# Patient Record
Sex: MALE | Race: BLACK OR AFRICAN AMERICAN | NOT HISPANIC OR LATINO | Employment: OTHER | URBAN - METROPOLITAN AREA
[De-identification: names, ages, dates, MRNs, and addresses within clinical notes are randomized per-mention and may not be internally consistent; named-entity substitution may affect disease eponyms.]

---

## 2017-01-24 ENCOUNTER — TELEPHONE (OUTPATIENT)
Dept: INTERNAL MEDICINE | Facility: CLINIC | Age: 22
End: 2017-01-24

## 2017-01-24 NOTE — TELEPHONE ENCOUNTER
Please advise pt mother states pt need a referral to be fax over to podiatry at [place] fax number [phone]

## 2017-01-26 DIAGNOSIS — B07.0 PLANTAR WART: Primary | ICD-10-CM

## 2017-03-27 ENCOUNTER — OFFICE VISIT (OUTPATIENT)
Dept: INTERNAL MEDICINE | Facility: CLINIC | Age: 22
End: 2017-03-27
Payer: MEDICAID

## 2017-03-27 VITALS
HEART RATE: 74 BPM | WEIGHT: 138.44 LBS | BODY MASS INDEX: 20.98 KG/M2 | SYSTOLIC BLOOD PRESSURE: 104 MMHG | DIASTOLIC BLOOD PRESSURE: 60 MMHG | HEIGHT: 68 IN

## 2017-03-27 DIAGNOSIS — L72.9 CYST OF SKIN: ICD-10-CM

## 2017-03-27 DIAGNOSIS — L02.412 ABSCESS OF AXILLA, LEFT: Primary | ICD-10-CM

## 2017-03-27 DIAGNOSIS — M79.671 RIGHT FOOT PAIN: ICD-10-CM

## 2017-03-27 PROCEDURE — 99213 OFFICE O/P EST LOW 20 MIN: CPT | Mod: PBBFAC | Performed by: INTERNAL MEDICINE

## 2017-03-27 PROCEDURE — 99999 PR PBB SHADOW E&M-EST. PATIENT-LVL III: CPT | Mod: PBBFAC,,, | Performed by: INTERNAL MEDICINE

## 2017-03-27 PROCEDURE — 99213 OFFICE O/P EST LOW 20 MIN: CPT | Mod: S$PBB,,, | Performed by: INTERNAL MEDICINE

## 2017-03-27 RX ORDER — DICLOFENAC SODIUM 10 MG/G
2 GEL TOPICAL 4 TIMES DAILY
Qty: 100 G | Refills: 1 | Status: SHIPPED | OUTPATIENT
Start: 2017-03-27 | End: 2017-04-06

## 2017-03-27 RX ORDER — SULFAMETHOXAZOLE AND TRIMETHOPRIM 800; 160 MG/1; MG/1
1 TABLET ORAL 2 TIMES DAILY
Qty: 20 TABLET | Refills: 0 | Status: SHIPPED | OUTPATIENT
Start: 2017-03-27 | End: 2017-04-06

## 2017-03-27 NOTE — PROGRESS NOTES
CHIEF COMPLAINT:  Cyst.    HISTORY OF PRESENT ILLNESS:  The patient is a 21-year-old gentleman who was   originally marked as a physical, but he is not due until July for his physical,   who comes in today with complaints of a bump in the right groin area.  Symptoms   started maybe two weeks ago.  He tried to express it, but nothing came out.  He   does have a history of hidradenitis.  He did have surgery for this condition.    REVIEW OF SYSTEMS:  The patient reports no fever or chills.  He has good range   of motion with his shoulders.  He did have some contractures after his   surgeries, but these have since been resolved.  He still has right foot pain for   what we felt was a plantar wart.  He has not been able to see a podiatrist yet   for this condition.  He will take ibuprofen 600 mg maybe twice a week.    PHYSICAL EXAMINATION:  GENERAL APPEARANCE:  No acute distress.  PULMONARY:  Good inspiratory and expiratory breath sounds are heard.  Lungs are   clear to auscultation.  CARDIOVASCULAR:  S1, S2.  EXTREMITIES:  Without edema.  ABDOMEN:  Nontender, nondistended, without hepatosplenomegaly.  The patient's right groin was evaluated.  The patient appears to have a cyst.    It appears to be a sebaceous cyst.  The patient's left axilla was evaluated.    The patient does have a small, less than 1 cm size bump.  He has right below it   a very tender pustule.    ASSESSMENT:  1.  Left axillary abscess.  2.  Right groin sebaceous cyst.    PLAN:  We will put the patient on Bactrim DS one p.o. b.i.d. for the next 10   days.  He is to follow up in two weeks for reevaluation.  We will try to see if   we can get diclofenac gel to put on his foot to help relieve the pain.    Ibuprofen does help, but I prefer him not to have to use such large doses.  This   is until he can get his foot seen by Podiatry.  The patient was advised to   contact his insurance to find out which providers take his insurance.      ABRAHAM/DEMAR  dd:  03/27/2017 12:15:01 (CDT)  td: 03/28/2017 08:29:15 (SUZYT)  Doc ID   #[id number]  Job ID #[id number]    CC:

## 2017-03-27 NOTE — MR AVS SNAPSHOT
[facility]  [address]  [facility] [address]  Phone: [phone]  Fax: [phone]                  Rico Fuller   3/27/2017 11:00 AM   Office Visit    Description:  Male : 1995   Provider:  Trey Obrien MD   Department:  [facility]           Reason for Visit     Cyst           Diagnoses this Visit        Comments    Abscess of axilla, left    -  Primary     Cyst of skin         Right foot pain                To Do List           Goals (5 Years of Data)     None      Follow-Up and Disposition     Return in about 2 weeks (around 4/10/2017).       These Medications        Disp Refills Start End    sulfamethoxazole-trimethoprim 800-160mg (BACTRIM DS) 800-160 mg Tab 20 tablet 0 3/27/2017 2017    Take 1 tablet by mouth 2 (two) times daily. - Oral    Pharmacy: [facility] [address] Ph #: [phone]       diclofenac sodium 1 % Gel 100 g 1 3/27/2017 2017    Apply 2 g topically 4 (four) times daily. - Topical    Pharmacy: [facility] [address] Ph #: [phone]         [facility]     [facility] Care Line -  Assistance  Registered nurses in the [facility] provide clinical advisement, health education, appointment booking, and other advisory services.  Call for this free service at [phone].             Medications           Message regarding Medications     Verify the changes and/or additions to your medication regime listed below are the same as discussed with your clinician today.  If any of these changes or additions are incorrect, please notify your healthcare provider.        START taking these NEW medications        Refills    sulfamethoxazole-trimethoprim 800-160mg (BACTRIM DS) 800-160 mg Tab 0    Sig: Take 1 tablet by mouth 2 (two) times daily.    Class: Normal    Route: Oral    diclofenac sodium 1 % Gel  "1    Sig: Apply 2 g topically 4 (four) times daily.    Class: Normal    Route: Topical           Verify that the below list of medications is an accurate representation of the medications you are currently taking.  If none reported, the list may be blank. If incorrect, please contact your healthcare provider. Carry this list with you in case of emergency.           Current Medications     ibuprofen (ADVIL,MOTRIN) 600 MG tablet Take 1 tablet (600 mg total) by mouth every 6 (six) hours as needed for Pain.    diclofenac sodium 1 % Gel Apply 2 g topically 4 (four) times daily.    sulfamethoxazole-trimethoprim 800-160mg (BACTRIM DS) 800-160 mg Tab Take 1 tablet by mouth 2 (two) times daily.           Clinical Reference Information           Your Vitals Were     BP Pulse Height Weight BMI    104/60 (BP Location: Right arm, Patient Position: Sitting, BP Method: Manual) 74 5' 8" (1.727 m) 62.8 kg (138 lb 7.2 oz) 21.05 kg/m2      Blood Pressure          Most Recent Value    BP  104/60      Allergies as of 3/27/2017     No Known Allergies      Immunizations Administered on Date of Encounter - 3/27/2017     None      [facility] Sign-Up     Activating your [facility] account is as easy as 1-2-3!     1) Visit [email or web address], select Sign Up Now, enter this activation code and your date of birth, then select Next.  [email or web address]  Expires: 5/11/2017 12:07 PM      2) Create a username and password to use when you visit [facility] in the future and select a security question in case you lose your password and select Next.    3) Enter your e-mail address and click Sign Up!    Additional Information  If you have questions, please e-mail [email or web address] or call [phone] to talk to our [facility] staff. Remember, [facility] is NOT to be used for urgent needs. For medical emergencies, dial 911.         Smoking Cessation     If you would like to quit smoking:   You may be eligible for free services if you are a [place] resident " and started smoking cigarettes before September 1, 1988.  Call the Smoking Cessation Trust (SCT) toll free at (581) 982-6886 or (739) 794-9387.   Call 1-800-QUIT-NOW if you do not meet the above criteria.            Language Assistance Services     ATTENTION: Language assistance services are available, free of charge. Please call [phone].      ATENCIÓN: Si habla español, tiene a logan disposición servicios gratuitos de asistencia lingüística. Llame al [phone].     CHÚ Ý: N?u b?n nói Ti?ng Vi?t, có các d?ch v? h? tr? ngôn ng? mi?n phí dành cho b?n. G?i s? [phone].         [facility] complies with applicable Federal civil rights laws and does not discriminate on the basis of race, color, national origin, age, disability, or sex.

## 2017-04-04 ENCOUNTER — TELEPHONE (OUTPATIENT)
Dept: INTERNAL MEDICINE | Facility: CLINIC | Age: 22
End: 2017-04-04

## 2017-04-04 NOTE — TELEPHONE ENCOUNTER
----- Message from Radha Baird sent at 4/4/2017 11:36 AM CDT -----  Contact: Fabiola (mom)  The diclofenac sodium 1 % Gel is too expensive and she would like Dr. Obrien to prescribe something else.     [facility] [place] [phone] (Phone) [phone] (Fax)    You can reach Fabiola at [phone].    Thanks!

## 2017-10-26 ENCOUNTER — TELEPHONE (OUTPATIENT)
Dept: INTERNAL MEDICINE | Facility: CLINIC | Age: 22
End: 2017-10-26

## 2017-10-26 NOTE — TELEPHONE ENCOUNTER
----- Message from Noni De La Vega sent at 10/26/2017 10:33 AM CDT -----  Dr Obrien bookout on him today.  He needs to follow up with him. First available not until Feb/2018 please call to schedule him and appt

## 2017-12-15 ENCOUNTER — HOSPITAL ENCOUNTER (EMERGENCY)
Facility: OTHER | Age: 22
Discharge: HOME | End: 2017-12-15
Attending: EMERGENCY MEDICINE
Payer: MEDICAID

## 2017-12-15 VITALS
HEIGHT: 68 IN | TEMPERATURE: 98 F | OXYGEN SATURATION: 99 % | RESPIRATION RATE: 18 BRPM | SYSTOLIC BLOOD PRESSURE: 125 MMHG | WEIGHT: 140 LBS | DIASTOLIC BLOOD PRESSURE: 85 MMHG | HEART RATE: 75 BPM | BODY MASS INDEX: 21.22 KG/M2

## 2017-12-15 DIAGNOSIS — L02.91 ABSCESS: Primary | ICD-10-CM

## 2017-12-15 PROCEDURE — 10060 I&D ABSCESS SIMPLE/SINGLE: CPT

## 2017-12-15 PROCEDURE — 99284 EMERGENCY DEPT VISIT MOD MDM: CPT | Mod: 25

## 2017-12-15 PROCEDURE — 25000003 PHARM REV CODE 250: Performed by: PHYSICIAN ASSISTANT

## 2017-12-15 RX ORDER — CEPHALEXIN 250 MG/1
500 CAPSULE ORAL 4 TIMES DAILY
Qty: 80 CAPSULE | Refills: 0 | Status: SHIPPED | OUTPATIENT
Start: 2017-12-15 | End: 2017-12-25

## 2017-12-15 RX ORDER — SULFAMETHOXAZOLE AND TRIMETHOPRIM 800; 160 MG/1; MG/1
1 TABLET ORAL 2 TIMES DAILY
Qty: 20 TABLET | Refills: 0 | Status: SHIPPED | OUTPATIENT
Start: 2017-12-15 | End: 2017-12-25

## 2017-12-15 RX ORDER — HYDROCODONE BITARTRATE AND ACETAMINOPHEN 5; 325 MG/1; MG/1
1 TABLET ORAL EVERY 6 HOURS PRN
Qty: 5 TABLET | Refills: 0 | Status: SHIPPED | OUTPATIENT
Start: 2017-12-15 | End: 2017-12-25

## 2017-12-15 RX ORDER — HYDROCODONE BITARTRATE AND ACETAMINOPHEN 5; 325 MG/1; MG/1
1 TABLET ORAL
Status: COMPLETED | OUTPATIENT
Start: 2017-12-15 | End: 2017-12-15

## 2017-12-15 RX ORDER — LIDOCAINE HYDROCHLORIDE 20 MG/ML
5 INJECTION, SOLUTION INFILTRATION; PERINEURAL
Status: COMPLETED | OUTPATIENT
Start: 2017-12-15 | End: 2017-12-15

## 2017-12-15 RX ADMIN — LIDOCAINE HYDROCHLORIDE 5 ML: 20 INJECTION, SOLUTION INFILTRATION; PERINEURAL at 06:12

## 2017-12-15 RX ADMIN — HYDROCODONE BITARTRATE AND ACETAMINOPHEN 1 TABLET: 5; 325 TABLET ORAL at 06:12

## 2017-12-15 NOTE — ED TRIAGE NOTES
Pt reports to ED c/o abscess to back of right thigh x 1 week and to lower sacrum x 4 days. Pt states unable to tolerate pain. Denies discharge, fevers.

## 2017-12-16 NOTE — ED PROVIDER NOTES
Encounter Date: 12/15/2017       History     Chief Complaint   Patient presents with    Abscess     multiple abscess to buttocks. denies any drainage. denies any fever     Patient is a 22-year-old male with no significant medical history who presents to the emergency department with wound to right buttocks.  He states over the last several days he has noticed swelling to his right buttocks.  He states he believes that he has a boil.  He states he has had this in the past.  He denies any drainage.  He denies fevers or chills.  He rates his pain 10 out of 10.      The history is provided by the patient.     Review of patient's allergies indicates:  No Known Allergies  No past medical history on file.  Past Surgical History:   Procedure Laterality Date    CYST REMOVAL      cyst removed from under both arms      Family History   Problem Relation Age of Onset    Asthma Mother     Clotting disorder Mother     Lupus Mother     Seizures Mother     Depression Mother     Seizures Father      Social History   Substance Use Topics    Smoking status: Current Every Day Smoker     Packs/day: 0.50     Years: 4.00     Types: Cigarettes    Smokeless tobacco: Never Used    Alcohol use No     Review of Systems   Constitutional: Negative for activity change, appetite change, chills, fatigue and fever.   HENT: Negative for congestion, ear discharge, ear pain, postnasal drip, rhinorrhea, sore throat and trouble swallowing.    Respiratory: Negative for cough and shortness of breath.    Cardiovascular: Negative for chest pain.   Gastrointestinal: Negative for abdominal pain, blood in stool, constipation, diarrhea, nausea and vomiting.   Genitourinary: Negative for dysuria, flank pain and hematuria.   Musculoskeletal: Negative for back pain, neck pain and neck stiffness.   Skin: Positive for wound. Negative for rash.   Neurological: Negative for dizziness, speech difficulty, weakness, light-headedness, numbness and headaches.        Physical Exam     Initial Vitals [12/15/17 1617]   BP Pulse Resp Temp SpO2   123/68 79 18 98.3 °F (36.8 °C) 97 %      MAP       86.33         Physical Exam    Nursing note and vitals reviewed.  Constitutional: He appears well-developed and well-nourished. He is not diaphoretic.  Non-toxic appearance. No distress.   HENT:   Head: Normocephalic.   Right Ear: Hearing and external ear normal.   Left Ear: Hearing and external ear normal.   Nose: Nose normal.   Mouth/Throat: Oropharynx is clear and moist. No oropharyngeal exudate.   Eyes: Conjunctivae are normal. Pupils are equal, round, and reactive to light.   Neck: Normal range of motion.   Cardiovascular: Normal rate and regular rhythm.   Pulmonary/Chest: Breath sounds normal.   Abdominal: Soft. Bowel sounds are normal. There is no tenderness.   Lymphadenopathy:     He has no cervical adenopathy.   Neurological: He is alert and oriented to person, place, and time.   Skin: Skin is warm and dry. Capillary refill takes less than 2 seconds.        Psychiatric: He has a normal mood and affect.         ED Course   I & D - Incision and Drainage  Date/Time: 12/16/2017 12:50 AM  Performed by: ANTONIETA MCCOY  Authorized by: MATEUSZ KWAN II   Type: abscess  Location: buttocks.  Anesthesia: local infiltration    Anesthesia:  Local Anesthetic: lidocaine 2% without epinephrine  Anesthetic total: 5 mL  Scalpel size: 11  Incision type: single straight  Complexity: simple  Drainage: purulent  Drainage amount: copious  Patient tolerance: Patient tolerated the procedure well with no immediate complications        Labs Reviewed - No data to display          Medical Decision Making:   Initial Assessment:   Urgent evaluation of 22-year-old male with no significant medical history who presents to the emergency department with wound to buttocks.  Patient is afebrile, nontoxic appearing, and hemodynamically stable.  On exam, patient has abscess on right buttocks.   I&D is performed with copious purulent drainage.  Patient is sent home with antibiotics.  He is advised to follow-up with PCP in 24-48 hours for wound reevaluation or return to the emergency department with any worsening symptoms or concerns.                   ED Course      Clinical Impression:   The encounter diagnosis was Abscess.                           Vanda Light PA-C  12/16/17 0051

## 2018-01-25 ENCOUNTER — TELEPHONE (OUTPATIENT)
Dept: INTERNAL MEDICINE | Facility: CLINIC | Age: 23
End: 2018-01-25

## 2018-01-25 NOTE — TELEPHONE ENCOUNTER
----- Message from Khloe Ariza sent at 1/24/2018  2:31 PM CST -----  Contact: self/ [phone]  Sooner appointment than the  can schedule.  Did you offer to schedule the next available appointment and put the patient on the wait list?:    When is the first available appointment:   What is the nature of the appointment: phys  What visit type: epp  Patient preference of timeframe to be scheduled:    Comments:

## 2018-01-25 NOTE — TELEPHONE ENCOUNTER
No answer, message left for the pt to call back.    Okay to scheduled an urgent care appt once the pt call back.

## 2018-08-01 ENCOUNTER — OFFICE VISIT (OUTPATIENT)
Dept: INTERNAL MEDICINE | Facility: CLINIC | Age: 23
End: 2018-08-01
Payer: MEDICAID

## 2018-08-01 VITALS
HEIGHT: 68 IN | DIASTOLIC BLOOD PRESSURE: 60 MMHG | HEART RATE: 92 BPM | SYSTOLIC BLOOD PRESSURE: 120 MMHG | WEIGHT: 146 LBS | BODY MASS INDEX: 22.13 KG/M2

## 2018-08-01 DIAGNOSIS — H02.9 LESION OF EYELID: Primary | ICD-10-CM

## 2018-08-01 PROCEDURE — 99213 OFFICE O/P EST LOW 20 MIN: CPT | Mod: S$PBB,,, | Performed by: PHYSICIAN ASSISTANT

## 2018-08-01 PROCEDURE — 99999 PR PBB SHADOW E&M-EST. PATIENT-LVL IV: CPT | Mod: PBBFAC,,, | Performed by: PHYSICIAN ASSISTANT

## 2018-08-01 PROCEDURE — 99214 OFFICE O/P EST MOD 30 MIN: CPT | Mod: PBBFAC | Performed by: PHYSICIAN ASSISTANT

## 2018-08-01 RX ORDER — SULFAMETHOXAZOLE AND TRIMETHOPRIM 800; 160 MG/1; MG/1
1 TABLET ORAL 2 TIMES DAILY
Qty: 14 TABLET | Refills: 0 | Status: SHIPPED | OUTPATIENT
Start: 2018-08-01 | End: 2020-12-01 | Stop reason: ALTCHOICE

## 2018-08-01 RX ORDER — ERYTHROMYCIN 5 MG/G
OINTMENT OPHTHALMIC 3 TIMES DAILY
Qty: 3.5 G | Refills: 0 | Status: SHIPPED | OUTPATIENT
Start: 2018-08-01 | End: 2020-12-01

## 2018-08-01 NOTE — PATIENT INSTRUCTIONS
Warm compresses to the left eye      Sty (or Stye)  A sty is an infection of the oil gland of the eyelid. It may develop into a small pocket of pus (abscess). This can cause pain, redness, and swelling. In early stages, styes are treated with antibiotic cream, eye drops, or warm packs (small towels soaked in warm water). More severe cases may need to be opened and drained by a health care provider.  Home care  · Eye drops or ointment are usually prescribed to treat the infection. Use these as directed.   ¨ Artificial tears may also be used to lubricate the eye and make it more comfortable. These may be purchased without a prescription.   ¨ Talk to your health care provider before using any over-the-counter treatment for a sty.  · Apply a warm, damp towel to the affected eye for at least 5 minutes, 3 to 4 times a day for a week. Warm compresses open the pores and speed the healing. If the compresses are too hot, they may burn your eyelid.  · Sometimes the sty will drain with this treatment alone. If this happens, continue the antibiotic until all the redness and swelling are gone.  · Wash your hands before and after touching the infected eye to avoid spreading the infection.  · Do not squeeze or try to puncture the sty.  Follow-up care  Follow up with your health care provider, or as advised.   When to seek medical advice  Call your health care provider right away if you have:  · Increase in swelling or redness around the eyelid after 48 to 72 hours  · Increase in eye pain or the eyelid blisters  · Increase in warmth--the eyelid feels hot  · Drainage of blood or thick pus from the sty  · Blister on the eyelid  · Inability to open the eyelid due to swelling  · Fever  ¨ 1 degree above your normal temperature lasting for 24 to 48 hours, or  ¨ Whatever your health care provider told you to report based on your medical condition  · Vision changes  · Headache or stiff neck  · Recurrence of the sty  Date Last Reviewed:  6/14/2015  © 3845-9131 [employer]. [address] [place] [address], [place] [address]. All rights reserved. This information is not intended as a substitute for professional medical care. Always follow your healthcare professional's instructions.

## 2018-08-01 NOTE — PROGRESS NOTES
"Subjective:       Patient ID: Rico Fuller is a 23 y.o. male.        Chief Complaint: Follow-up    Rico Fuller is an established patient of Trey Obrien MD here today for urgent care visit.    "Bump" left eye x 2 weeks.  He squeezed it and it got a little smaller.  He was not able to get it to open.  It is mildly painful when he pushes on it.  No visual disturbance.  He has been applying alcohol to it.             Review of Systems   Constitutional: Negative for appetite change, chills, fatigue and fever.   HENT: Negative for congestion and sore throat.         Bump above left eye   Eyes: Negative for visual disturbance.   Respiratory: Negative for cough, chest tightness and shortness of breath.    Cardiovascular: Negative for chest pain, palpitations and leg swelling.   Gastrointestinal: Negative for abdominal pain, blood in stool, constipation, diarrhea, nausea and vomiting.   Genitourinary: Negative for dysuria, frequency, hematuria and urgency.   Musculoskeletal: Negative for arthralgias and back pain.   Skin: Negative for rash.   Neurological: Negative for dizziness, syncope, weakness and headaches.   Psychiatric/Behavioral: Negative for dysphoric mood and sleep disturbance. The patient is not nervous/anxious.        Objective:      Physical Exam   Constitutional: He appears well-developed and well-nourished. No distress.   HENT:   Head: Normocephalic.   Right Ear: External ear normal.   Left Ear: External ear normal.   Eyes: Conjunctivae and EOM are normal. Pupils are equal, round, and reactive to light. Right eye exhibits no discharge. Left eye exhibits no discharge.       Neck: Neck supple.   Pulmonary/Chest: Effort normal.   Abdominal: Soft.   Neurological: He is alert.   Skin: Skin is warm and dry. He is not diaphoretic.   Psychiatric: He has a normal mood and affect.       Assessment:       1. Lesion of eyelid        Plan:       Rico was seen today for follow-up.    Diagnoses and all orders for " "this visit:    Lesion of eyelid  -     Ambulatory referral to Optometry  -     sulfamethoxazole-trimethoprim 800-160mg (BACTRIM DS) 800-160 mg Tab; Take 1 tablet by mouth 2 (two) times daily.  -     erythromycin (ROMYCIN) ophthalmic ointment; Place into the left eye 3 (three) times daily.    Warm compresses to affected area.  F/u with optometry if not resolving.    Pt has been given instructions populated from [facility] database and has verbalized understanding of the after visit summary and information contained wherein.    Follow up with a primary care provider. May go to ER for acute shortness of breath, lightheadedness, fever, or any other emergent complaints or changes in condition.    "This note will be shared with the patient"    No future appointments.            "

## 2018-08-02 ENCOUNTER — TELEPHONE (OUTPATIENT)
Dept: INTERNAL MEDICINE | Facility: CLINIC | Age: 23
End: 2018-08-02

## 2018-08-02 NOTE — TELEPHONE ENCOUNTER
----- Message from Noni De La Vega sent at 8/1/2018  3:01 PM CDT -----  Mr Gómez needs to be seen next week. Nothing is coming up to me.  Please call with an apt

## 2018-08-02 NOTE — TELEPHONE ENCOUNTER
Unable to get sooner appt, scheduled appt across the street and added to wait list for sooner appt.

## 2018-10-04 ENCOUNTER — OFFICE VISIT (OUTPATIENT)
Dept: OPTOMETRY | Facility: CLINIC | Age: 23
End: 2018-10-04
Payer: MEDICAID

## 2018-10-04 DIAGNOSIS — H40.013 OAG (OPEN ANGLE GLAUCOMA) SUSPECT, LOW RISK, BILATERAL: ICD-10-CM

## 2018-10-04 DIAGNOSIS — D23.10 DERMOID CYST OF LEFT EYELID: Primary | ICD-10-CM

## 2018-10-04 DIAGNOSIS — H50.331 INTERMITTENT MONOCULAR EXOTROPIA, RIGHT EYE: ICD-10-CM

## 2018-10-04 PROCEDURE — 92004 COMPRE OPH EXAM NEW PT 1/>: CPT | Mod: S$PBB,,, | Performed by: OPTOMETRIST

## 2018-10-04 PROCEDURE — 99213 OFFICE O/P EST LOW 20 MIN: CPT | Mod: PBBFAC,25 | Performed by: OPTOMETRIST

## 2018-10-04 PROCEDURE — 99999 PR PBB SHADOW E&M-EST. PATIENT-LVL III: CPT | Mod: PBBFAC,,, | Performed by: OPTOMETRIST

## 2018-10-04 PROCEDURE — 92133 CPTRZD OPH DX IMG PST SGM ON: CPT | Mod: PBBFAC | Performed by: OPTOMETRIST

## 2018-10-04 NOTE — PROGRESS NOTES
HPI     Pt is here for annual exam and lesion OS. Lesion has been there for two   months. Pt states that lesion decreased in size however it enlarged a   month ago. No changes in va. Some  Sharp pain in the are where the lesion   is.     Last edited by Felicia De La Cruz on 10/4/2018  9:22 AM. (History)            Assessment /Plan     For exam results, see Encounter Report.    Dermoid cyst of left eyelid  -     Ambulatory Referral to Ophthalmology  -Referral Rosita excision    Intermittent monocular exotropia, right eye  -Long standing, good VA    OAG (open angle glaucoma) suspect, low risk, bilateral  -     OCT, Optic Nerve - OU - Both Eyes; Future  -OCT RNFL thinning, FHx Mother  -HVF 24-2ss with review  -consider Latanoprost if defect on HVF  -Suspect vs Physio enlarged CD's        RTC as directed OMD

## 2018-11-15 ENCOUNTER — TELEPHONE (OUTPATIENT)
Dept: OPHTHALMOLOGY | Facility: CLINIC | Age: 23
End: 2018-11-15

## 2018-11-15 NOTE — TELEPHONE ENCOUNTER
----- Message from Madelin Cortez sent at 11/15/2018  9:20 AM CST -----  Contact: Essie (girlfriend)  Pt girlfriend states that transportation cannot pick pt up for his appt today so pt have to cancel this appt. Pt girlfriend states pt need to reschedule this appt. I tried to reschedule this appt but I couldn't. Pt girlfriend can be reached at [phone].

## 2018-11-27 ENCOUNTER — TELEPHONE (OUTPATIENT)
Dept: OPHTHALMOLOGY | Facility: CLINIC | Age: 23
End: 2018-11-27

## 2018-11-27 NOTE — TELEPHONE ENCOUNTER
----- Message from Madelin Cortez sent at 11/27/2018  4:05 PM CST -----  Contact: Essie (girlfriend)  Essie pt girlfriend stated that they cancel pt appt on 11/15 but they didn't cancel pt appt on 11/29. Essie pt girlfriend wanted to know why was the appt on 11/29 cancel. Essie can be reached at [phone].

## 2018-11-27 NOTE — TELEPHONE ENCOUNTER
/After looking in the chart, it shows that patient himself cx appt on 11/15/18 due to transportation. It also shows that girlfriend, Essie, cx appt on 11/29/19 due to transportation issues as well. Essie denies cx this appt. She wanted to speak with supervisor. After speaking with Sharonda, Sharonda overbooked pt on 12/4/18.    No further questions/concerns.

## 2018-12-04 ENCOUNTER — TELEPHONE (OUTPATIENT)
Dept: OPHTHALMOLOGY | Facility: CLINIC | Age: 23
End: 2018-12-04

## 2018-12-04 NOTE — TELEPHONE ENCOUNTER
Patient was scheduled in early November and patient r/s due to transportation issues.    Patient's girlfriend called and cancelled the patient appointment to late November due to further transportation issues.    Patient's girlfriend called and complained that the patient's appointment was cancelled without permission from her or the patient. It was noted in the chart that both the patient and the girlfriend r/s or cancelled both appointments due to transportation issue.    Girlfriend requested to speak with manager and Sharonda overbooked Dr. Becker's schedule to get patient seen today.    Today patient has no-showed appt.

## 2019-01-15 ENCOUNTER — TELEPHONE (OUTPATIENT)
Dept: OPHTHALMOLOGY | Facility: CLINIC | Age: 24
End: 2019-01-15

## 2019-01-15 NOTE — TELEPHONE ENCOUNTER
Call was transferred to me from phone room, from an outside clinic ?[facility]. Ms Arreguin told me she worked there. I offered her our 1st available consult. 3/28/19 and asked her to send us a referral, and past notes. She then told me she was his girlfriend. I asked for her name she told me her name is Carmen. Previous appointments have been cx, or ns.

## 2020-08-17 ENCOUNTER — HOSPITAL ENCOUNTER (EMERGENCY)
Facility: OTHER | Age: 25
Discharge: HOME | End: 2020-08-17
Attending: EMERGENCY MEDICINE
Payer: MEDICAID

## 2020-08-17 VITALS
HEART RATE: 91 BPM | HEIGHT: 68 IN | DIASTOLIC BLOOD PRESSURE: 80 MMHG | BODY MASS INDEX: 21.98 KG/M2 | SYSTOLIC BLOOD PRESSURE: 133 MMHG | OXYGEN SATURATION: 98 % | RESPIRATION RATE: 18 BRPM | WEIGHT: 145 LBS | TEMPERATURE: 98 F

## 2020-08-17 DIAGNOSIS — R30.0 DYSURIA: Primary | ICD-10-CM

## 2020-08-17 LAB
BILIRUB UR QL STRIP: NEGATIVE
CLARITY UR: CLEAR
COLOR UR: YELLOW
GLUCOSE UR QL STRIP: NEGATIVE
HGB UR QL STRIP: NEGATIVE
KETONES UR QL STRIP: NEGATIVE
LEUKOCYTE ESTERASE UR QL STRIP: NEGATIVE
NITRITE UR QL STRIP: NEGATIVE
PH UR STRIP: 7 [PH] (ref 5–8)
PROT UR QL STRIP: NEGATIVE
SP GR UR STRIP: 1.02 (ref 1–1.03)
URN SPEC COLLECT METH UR: NORMAL
UROBILINOGEN UR STRIP-ACNC: NEGATIVE EU/DL

## 2020-08-17 PROCEDURE — 96372 THER/PROPH/DIAG INJ SC/IM: CPT

## 2020-08-17 PROCEDURE — 63600175 PHARM REV CODE 636 W HCPCS: Performed by: PHYSICIAN ASSISTANT

## 2020-08-17 PROCEDURE — 87591 N.GONORRHOEAE DNA AMP PROB: CPT

## 2020-08-17 PROCEDURE — 63700000 PHARM REV CODE 250 ALT 637 W/O HCPCS: Performed by: PHYSICIAN ASSISTANT

## 2020-08-17 PROCEDURE — 99284 EMERGENCY DEPT VISIT MOD MDM: CPT | Mod: 25

## 2020-08-17 PROCEDURE — 81003 URINALYSIS AUTO W/O SCOPE: CPT

## 2020-08-17 RX ORDER — CEFTRIAXONE 250 MG/1
250 INJECTION, POWDER, FOR SOLUTION INTRAMUSCULAR; INTRAVENOUS
Status: COMPLETED | OUTPATIENT
Start: 2020-08-17 | End: 2020-08-17

## 2020-08-17 RX ORDER — AZITHROMYCIN 250 MG/1
1000 TABLET, FILM COATED ORAL
Status: COMPLETED | OUTPATIENT
Start: 2020-08-17 | End: 2020-08-17

## 2020-08-17 RX ADMIN — CEFTRIAXONE SODIUM 250 MG: 250 INJECTION, POWDER, FOR SOLUTION INTRAMUSCULAR; INTRAVENOUS at 01:08

## 2020-08-17 RX ADMIN — AZITHROMYCIN MONOHYDRATE 1000 MG: 250 TABLET ORAL at 01:08

## 2020-08-17 NOTE — ED TRIAGE NOTES
Patient presents to ED with c/o lower abdominal cramping, urinary frequency, urgency, and dysuria x 2 weeks with mild white discharge. He reports having unprotected sex recently. Denies fever

## 2020-08-17 NOTE — ED PROVIDER NOTES
Encounter Date: 8/17/2020       History     Chief Complaint   Patient presents with    Dysuria     +Pt reports unprotected sex x1 month ago, now reporting dysuria with urgency/frequency. Denies heamturia, discharge.     Exposure to STD     Patient is a 25-year-old male with no significant medical history who presents to the emergency department with dysuria.  Patient states 1 month ago he had unprotected sex.  He states over the last several days he has had dysuria and urinary frequency.  He states that he does have slight discharge.  He denies any penile lesions.  He denies fevers or chills.  He denies hematuria.    The history is provided by the patient.     Review of patient's allergies indicates:  No Known Allergies  No past medical history on file.  Past Surgical History:   Procedure Laterality Date    CYST REMOVAL      cyst removed from under both arms      Family History   Problem Relation Age of Onset    Asthma Mother     Clotting disorder Mother     Lupus Mother     Seizures Mother     Depression Mother     Seizures Father      Social History     Tobacco Use    Smoking status: Current Every Day Smoker     Packs/day: 0.50     Years: 4.00     Pack years: 2.00     Types: Cigarettes    Smokeless tobacco: Never Used   Substance Use Topics    Alcohol use: No     Alcohol/week: 0.0 standard drinks    Drug use: Yes     Types: Marijuana     Review of Systems   Constitutional: Negative for activity change, appetite change, chills, fatigue and fever.   HENT: Negative for congestion, ear discharge, ear pain, rhinorrhea and sore throat.    Respiratory: Negative for cough.    Gastrointestinal: Negative for abdominal pain, blood in stool, constipation, diarrhea, nausea and vomiting.   Genitourinary: Positive for discharge, dysuria and frequency.   Musculoskeletal: Negative for back pain, neck pain and neck stiffness.   Skin: Negative for rash and wound.   Neurological: Negative for dizziness, weakness,  light-headedness and headaches.       Physical Exam     Initial Vitals [08/17/20 1113]   BP Pulse Resp Temp SpO2   133/80 91 18 98.3 °F (36.8 °C) 98 %      MAP       --         Physical Exam    Nursing note and vitals reviewed.  Constitutional: He appears well-developed and well-nourished. He is not diaphoretic.  Non-toxic appearance. No distress.   HENT:   Head: Normocephalic.   Right Ear: Hearing and external ear normal.   Left Ear: Hearing and external ear normal.   Nose: Nose normal.   Mouth/Throat: Oropharynx is clear and moist. No oropharyngeal exudate.   Eyes: Conjunctivae are normal. Pupils are equal, round, and reactive to light.   Neck: Normal range of motion.   Cardiovascular: Normal rate and regular rhythm.   Pulmonary/Chest: Breath sounds normal.   Abdominal: Soft. Bowel sounds are normal. There is no abdominal tenderness. There is no rigidity, no rebound, no guarding, no CVA tenderness, no tenderness at McBurney's point and negative Zhang's sign.   Lymphadenopathy:     He has no cervical adenopathy.   Neurological: He is alert and oriented to person, place, and time.   Skin: Skin is warm and dry. Capillary refill takes less than 2 seconds.   Psychiatric: He has a normal mood and affect.         ED Course   Procedures  Labs Reviewed   C. TRACHOMATIS/N. GONORRHOEAE BY AMP DNA   URINALYSIS, REFLEX TO URINE CULTURE          Imaging Results    None          Medical Decision Making:   Initial Assessment:   Urgent evaluation of a 25-year-old female with no significant medical history who presents to the emergency department with dysuria.  Patient is afebrile, nontoxic appearing, and hemodynamically stable.  Benign abdominal exam.  No CVA tenderness.  Patient is concerned for STDs.  Urinalysis and culture will be obtained.  Patient will be given empiric treatment for gonorrhea and chlamydia.  ED Management:  12:50 PM  Pt has received empiric treatment.  Advised to follow up with PCP in 24 - 48 hours for  reevaluation or return to ED with a ny worsening symptoms or concerns.                                 Clinical Impression:       ICD-10-CM ICD-9-CM   1. Dysuria  R30.0 788.1                                Vanda Light PA-C  08/17/20 1251

## 2020-08-17 NOTE — FIRST PROVIDER EVALUATION
Emergency Department TeleTRIAGE Encounter Note      CHIEF COMPLAINT    Chief Complaint   Patient presents with    Dysuria     +Pt reports unprotected sex x1 month ago, now reporting dysuria with urgency/frequency. Denies heamturia, discharge.     Exposure to STD       VITAL SIGNS   Initial Vitals [08/17/20 1113]   BP Pulse Resp Temp SpO2   133/80 91 18 98.3 °F (36.8 °C) 98 %      MAP       --            ALLERGIES    Review of patient's allergies indicates:  No Known Allergies    PROVIDER TRIAGE NOTE  26 y/o male which presents with right kidney/bladder pain that began about 2 weeks ago. He is also having a penile discharge.       ORDERS  Labs Reviewed   C. TRACHOMATIS/N. GONORRHOEAE BY AMP DNA   URINALYSIS, REFLEX TO URINE CULTURE       ED Orders (720h ago, onward)    Start Ordered     Status Ordering Provider    08/17/20 1123 08/17/20 1122  Urinalysis, Reflex to Urine Culture Urine, Clean Catch  STAT      Ordered TETE PADRON    08/17/20 1122 08/17/20 1122  C. trachomatis/N. gonorrhoeae by AMP DNA [facility]; Urine  STAT  Collect    Ordered TETE PADRON            Virtual Visit Note: The provider triage portion of this emergency department evaluation and documentation was performed via [employer], a HIPAA-compliant telemedicine application, in concert with a tele-presenter in the room. A face to face patient evaluation with one of my colleagues will occur once the patient is placed in an emergency department room.      DISCLAIMER: This note was prepared with [employer] voice recognition transcription software. Garbled syntax, mangled pronouns, and other bizarre constructions may be attributed to that software system.

## 2020-08-21 LAB
C TRACH RRNA SPEC QL NAA+PROBE: NORMAL
N GONORRHOEA RRNA SPEC QL NAA+PROBE: NORMAL

## 2020-08-31 ENCOUNTER — TELEPHONE (OUTPATIENT)
Dept: INTERNAL MEDICINE | Facility: CLINIC | Age: 25
End: 2020-08-31

## 2020-08-31 DIAGNOSIS — N34.1 URETHRITIS, NONSPECIFIC: Primary | ICD-10-CM

## 2020-08-31 NOTE — TELEPHONE ENCOUNTER
----- Message from Trey Obrien MD sent at 8/31/2020  7:17 AM CDT -----  Please contact patient. He needs to do a urine sample for GC/Chlamydia. He was seen in the ED for urethritis . His test was indeterminate and he needs a repeat urine test. Order is in.

## 2020-09-01 ENCOUNTER — TELEPHONE (OUTPATIENT)
Dept: INTERNAL MEDICINE | Facility: CLINIC | Age: 25
End: 2020-09-01

## 2020-12-01 ENCOUNTER — LAB VISIT (OUTPATIENT)
Dept: PRIMARY CARE CLINIC | Facility: CLINIC | Age: 25
End: 2020-12-01
Payer: MEDICAID

## 2020-12-01 ENCOUNTER — OFFICE VISIT (OUTPATIENT)
Dept: PRIMARY CARE CLINIC | Facility: CLINIC | Age: 25
End: 2020-12-01
Payer: MEDICAID

## 2020-12-01 VITALS
SYSTOLIC BLOOD PRESSURE: 130 MMHG | WEIGHT: 145.5 LBS | DIASTOLIC BLOOD PRESSURE: 68 MMHG | OXYGEN SATURATION: 99 % | HEIGHT: 68 IN | TEMPERATURE: 98 F | HEART RATE: 97 BPM | BODY MASS INDEX: 22.05 KG/M2

## 2020-12-01 DIAGNOSIS — Z20.2 STD EXPOSURE: ICD-10-CM

## 2020-12-01 DIAGNOSIS — R36.9 PENILE DISCHARGE: Primary | ICD-10-CM

## 2020-12-01 LAB
BILIRUB UR QL STRIP: NEGATIVE
CLARITY UR REFRACT.AUTO: CLEAR
COLOR UR AUTO: YELLOW
GLUCOSE UR QL STRIP: NEGATIVE
HGB UR QL STRIP: NEGATIVE
KETONES UR QL STRIP: NEGATIVE
LEUKOCYTE ESTERASE UR QL STRIP: NEGATIVE
NITRITE UR QL STRIP: NEGATIVE
PH UR STRIP: 6 [PH] (ref 5–8)
PROT UR QL STRIP: NEGATIVE
SP GR UR STRIP: 1.02 (ref 1–1.03)
URN SPEC COLLECT METH UR: NORMAL

## 2020-12-01 PROCEDURE — 99213 OFFICE O/P EST LOW 20 MIN: CPT | Mod: PBBFAC,PN | Performed by: FAMILY MEDICINE

## 2020-12-01 PROCEDURE — 86592 SYPHILIS TEST NON-TREP QUAL: CPT

## 2020-12-01 PROCEDURE — 86703 HIV-1/HIV-2 1 RESULT ANTBDY: CPT

## 2020-12-01 PROCEDURE — 99214 OFFICE O/P EST MOD 30 MIN: CPT | Mod: S$PBB,,, | Performed by: FAMILY MEDICINE

## 2020-12-01 PROCEDURE — 81003 URINALYSIS AUTO W/O SCOPE: CPT

## 2020-12-01 PROCEDURE — 99999 PR PBB SHADOW E&M-EST. PATIENT-LVL III: CPT | Mod: PBBFAC,,, | Performed by: FAMILY MEDICINE

## 2020-12-01 PROCEDURE — 80074 ACUTE HEPATITIS PANEL: CPT

## 2020-12-01 PROCEDURE — 99999 PR PBB SHADOW E&M-EST. PATIENT-LVL III: ICD-10-PCS | Mod: PBBFAC,,, | Performed by: FAMILY MEDICINE

## 2020-12-01 PROCEDURE — 36415 COLL VENOUS BLD VENIPUNCTURE: CPT | Mod: PBBFAC,PN

## 2020-12-01 PROCEDURE — 99214 PR OFFICE/OUTPT VISIT, EST, LEVL IV, 30-39 MIN: ICD-10-PCS | Mod: S$PBB,,, | Performed by: FAMILY MEDICINE

## 2020-12-01 RX ORDER — AZITHROMYCIN 500 MG/1
1000 TABLET, FILM COATED ORAL ONCE
Qty: 2 TABLET | Refills: 0 | Status: SHIPPED | OUTPATIENT
Start: 2020-12-01 | End: 2020-12-01

## 2020-12-01 RX ORDER — CEFTRIAXONE 250 MG/1
250 INJECTION, POWDER, FOR SOLUTION INTRAMUSCULAR; INTRAVENOUS
Status: COMPLETED | OUTPATIENT
Start: 2020-12-01 | End: 2020-12-01

## 2020-12-01 RX ORDER — METRONIDAZOLE 500 MG/1
500 TABLET ORAL EVERY 12 HOURS
Qty: 14 TABLET | Refills: 0 | Status: SHIPPED | OUTPATIENT
Start: 2020-12-01 | End: 2020-12-08

## 2020-12-01 RX ADMIN — CEFTRIAXONE SODIUM 250 MG: 1 INJECTION, POWDER, FOR SOLUTION INTRAMUSCULAR; INTRAVENOUS at 11:12

## 2020-12-01 NOTE — PROGRESS NOTES
Clinic Note  12/1/2020      Subjective:       Patient ID:  Rico is a 25 y.o. male being seen for an new visit.      Chief Complaint: Penile Discharge, Annual Exam, and Establish Care    Urethritis-in August 2020, patient presented with urethritis and white penile discharge after urination.  Was only given Rocephin.  Urinalysis was normal and GC chlamydia testing was unable to be completed.  Since then, patient reports now penile clear discharge after urination.  Reports increased incomplete emptying, straining, urgency.  Denies rash, increased frequency, intermittency, weak stream, nocturia, fever, chills, dysuria.  Unclear of family history of prostate disease.      Family History   Problem Relation Age of Onset    Asthma Mother     Clotting disorder Mother     Lupus Mother     Seizures Mother     Depression Mother     Seizures Father      Social History     Socioeconomic History    Marital status: Single     Spouse name: Not on file    Number of children: Not on file    Years of education: Not on file    Highest education level: Not on file   Occupational History    Not on file   Social Needs    Financial resource strain: Not on file    Food insecurity     Worry: Not on file     Inability: Not on file    Transportation needs     Medical: Not on file     Non-medical: Not on file   Tobacco Use    Smoking status: Former Smoker     Packs/day: 0.50     Years: 4.00     Pack years: 2.00     Types: Cigarettes    Smokeless tobacco: Never Used   Substance and Sexual Activity    Alcohol use: No     Alcohol/week: 0.0 standard drinks    Drug use: Yes     Types: Marijuana    Sexual activity: Yes     Partners: Female     Birth control/protection: Condom   Lifestyle    Physical activity     Days per week: Not on file     Minutes per session: Not on file    Stress: Not on file   Relationships    Social connections     Talks on phone: Not on file     Gets together: Not on file     Attends Rastafari service:  "Not on file     Active member of club or organization: Not on file     Attends meetings of clubs or organizations: Not on file     Relationship status: Not on file   Other Topics Concern    Not on file   Social History Narrative    Not on file     Past Surgical History:   Procedure Laterality Date    CYST REMOVAL      cyst removed from under both arms      Medication List with Changes/Refills   New Medications    AZITHROMYCIN (ZITHROMAX) 500 MG TABLET    Take 2 tablets (1,000 mg total) by mouth once. for 1 dose    METRONIDAZOLE (FLAGYL) 500 MG TABLET    Take 1 tablet (500 mg total) by mouth every 12 (twelve) hours. for 7 days   Current Medications    DICLOFENAC SODIUM 1 % GEL    Apply 2 g topically 4 (four) times daily.   Discontinued Medications    ERYTHROMYCIN (ROMYCIN) OPHTHALMIC OINTMENT    Place into the left eye 3 (three) times daily.    IBUPROFEN (ADVIL,MOTRIN) 600 MG TABLET    Take 1 tablet (600 mg total) by mouth every 6 (six) hours as needed for Pain.    SULFAMETHOXAZOLE-TRIMETHOPRIM 800-160MG (BACTRIM DS) 800-160 MG TAB    Take 1 tablet by mouth 2 (two) times daily.     Patient Active Problem List   Diagnosis   (none) - all problems resolved or deleted     Review of Systems   Constitutional: Negative for chills, fever, malaise/fatigue and weight loss.   Respiratory: Negative for cough, shortness of breath and wheezing.    Cardiovascular: Negative for chest pain and palpitations.   Gastrointestinal: Negative for constipation, diarrhea, nausea and vomiting.   Genitourinary: Positive for urgency. Negative for dysuria, flank pain, frequency and hematuria.   Musculoskeletal: Negative for myalgias.   Skin: Negative for rash.         Objective:      /68 (BP Location: Right arm, Patient Position: Sitting, BP Method: Large (Manual))   Pulse 97   Temp 98.2 °F (36.8 °C) (Oral)   Ht 5' 8" (1.727 m)   Wt 66 kg (145 lb 8.1 oz)   SpO2 99%   BMI 22.12 kg/m²   Estimated body mass index is 22.12 kg/m² as " "calculated from the following:    Height as of this encounter: 5' 8" (1.727 m).    Weight as of this encounter: 66 kg (145 lb 8.1 oz).  Physical Exam   Constitutional: He is oriented to person, place, and time and well-developed, well-nourished, and in no distress. No distress.   HENT:   Head: Normocephalic and atraumatic.   Eyes: Conjunctivae and EOM are normal.   Cardiovascular: Normal rate, regular rhythm and normal heart sounds.   Pulmonary/Chest: Effort normal and breath sounds normal. No respiratory distress. He has no wheezes.   Abdominal: Soft.   Musculoskeletal: Normal range of motion.   Neurological: He is alert and oriented to person, place, and time. GCS score is 15.   Skin: Skin is warm and dry. No rash noted. He is not diaphoretic. No erythema.   Psychiatric: Affect normal.         Assessment and Plan:     1. Penile discharge  - will treat for gonorrhea, chlamydia, and Trichomonas with Rocephin, Zithromax, and Flagyl.  Unable to obtain testing for GC chlamydia or Trichomonas.  If symptoms still not improving, will test for mycoplasma genitalia.  Also consider enlarged prostate, will re-evaluate again in 1 month  - Urinalysis, Reflex to Urine Culture Urine, Clean Catch  - azithromycin (ZITHROMAX) 500 MG tablet; Take 2 tablets (1,000 mg total) by mouth once. for 1 dose  Dispense: 2 tablet; Refill: 0  - cefTRIAXone injection 250 mg  - metroNIDAZOLE (FLAGYL) 500 MG tablet; Take 1 tablet (500 mg total) by mouth every 12 (twelve) hours. for 7 days  Dispense: 14 tablet; Refill: 0    2. STD exposure  - Urinalysis, Reflex to Urine Culture Urine, Clean Catch  - HIV 1/2 Ag/Ab (4th Gen); Future  - Hepatitis Panel, Acute; Future  - RPR; Future        Follow up:   Follow up in about 1 month (around 1/1/2021) for urinary problems.     Other Orders Placed This Visit:  Orders Placed This Encounter   Procedures    Urinalysis, Reflex to Urine Culture Urine, Clean Catch     Order Specific Question:   Preferred Collection " Type     Answer:   Urine, Clean Catch     Order Specific Question:   Specimen Source     Answer:   Urine    HIV 1/2 Ag/Ab (4th Gen)     Standing Status:   Future     Standing Expiration Date:   1/1/2021    Hepatitis Panel, Acute     Standing Status:   Future     Standing Expiration Date:   1/1/2021    RPR     Standing Status:   Future     Standing Expiration Date:   1/1/2021           Serge Wright MD

## 2020-12-01 NOTE — PROGRESS NOTES
Verified pt ID using name and . NKDA. Administered cefTRIAXone injection 250 mg in right VG per physician order using aseptic technique. Aspirated and no blood return noted. Pt tolerated well with no adverse reactions noted.

## 2020-12-02 LAB
HAV IGM SERPL QL IA: NEGATIVE
HBV CORE IGM SERPL QL IA: NEGATIVE
HBV SURFACE AG SERPL QL IA: NEGATIVE
HCV AB SERPL QL IA: NEGATIVE
HIV 1+2 AB+HIV1 P24 AG SERPL QL IA: NEGATIVE
RPR SER QL: NORMAL

## 2020-12-23 ENCOUNTER — HOSPITAL ENCOUNTER (EMERGENCY)
Facility: OTHER | Age: 25
Discharge: HOME | End: 2020-12-23
Attending: EMERGENCY MEDICINE
Payer: MEDICAID

## 2020-12-23 VITALS
BODY MASS INDEX: 21.98 KG/M2 | OXYGEN SATURATION: 98 % | DIASTOLIC BLOOD PRESSURE: 72 MMHG | HEIGHT: 68 IN | SYSTOLIC BLOOD PRESSURE: 121 MMHG | HEART RATE: 100 BPM | TEMPERATURE: 99 F | RESPIRATION RATE: 20 BRPM | WEIGHT: 145 LBS

## 2020-12-23 DIAGNOSIS — M79.645 PAIN IN FINGER OF LEFT HAND: ICD-10-CM

## 2020-12-23 DIAGNOSIS — L02.31 ABSCESS, GLUTEAL, RIGHT: Primary | ICD-10-CM

## 2020-12-23 PROCEDURE — 99284 EMERGENCY DEPT VISIT MOD MDM: CPT | Mod: 25

## 2020-12-23 PROCEDURE — 25000003 PHARM REV CODE 250: Performed by: PHYSICIAN ASSISTANT

## 2020-12-23 PROCEDURE — 10160 PNXR ASPIR ABSC HMTMA BULLA: CPT

## 2020-12-23 PROCEDURE — 10060 I&D ABSCESS SIMPLE/SINGLE: CPT

## 2020-12-23 RX ORDER — SULFAMETHOXAZOLE AND TRIMETHOPRIM 800; 160 MG/1; MG/1
1 TABLET ORAL 2 TIMES DAILY
Qty: 10 TABLET | Refills: 0 | Status: SHIPPED | OUTPATIENT
Start: 2020-12-23 | End: 2020-12-28

## 2020-12-23 RX ORDER — MUPIROCIN 20 MG/G
1 OINTMENT TOPICAL
Status: COMPLETED | OUTPATIENT
Start: 2020-12-23 | End: 2020-12-23

## 2020-12-23 RX ORDER — IBUPROFEN 400 MG/1
400 TABLET ORAL
Status: COMPLETED | OUTPATIENT
Start: 2020-12-23 | End: 2020-12-23

## 2020-12-23 RX ORDER — SULFAMETHOXAZOLE AND TRIMETHOPRIM 800; 160 MG/1; MG/1
1 TABLET ORAL
Status: COMPLETED | OUTPATIENT
Start: 2020-12-23 | End: 2020-12-23

## 2020-12-23 RX ORDER — NAPROXEN 500 MG/1
500 TABLET ORAL EVERY 12 HOURS PRN
Qty: 14 TABLET | Refills: 0 | Status: SHIPPED | OUTPATIENT
Start: 2020-12-23

## 2020-12-23 RX ORDER — HYDROCODONE BITARTRATE AND ACETAMINOPHEN 5; 325 MG/1; MG/1
1 TABLET ORAL
Status: COMPLETED | OUTPATIENT
Start: 2020-12-23 | End: 2020-12-23

## 2020-12-23 RX ORDER — LIDOCAINE HYDROCHLORIDE 10 MG/ML
10 INJECTION INFILTRATION; PERINEURAL
Status: COMPLETED | OUTPATIENT
Start: 2020-12-23 | End: 2020-12-23

## 2020-12-23 RX ADMIN — IBUPROFEN 400 MG: 400 TABLET, FILM COATED ORAL at 02:12

## 2020-12-23 RX ADMIN — LIDOCAINE HYDROCHLORIDE 10 ML: 10 INJECTION, SOLUTION INFILTRATION; PERINEURAL at 02:12

## 2020-12-23 RX ADMIN — HYDROCODONE BITARTRATE AND ACETAMINOPHEN 1 TABLET: 5; 325 TABLET ORAL at 02:12

## 2020-12-23 RX ADMIN — SULFAMETHOXAZOLE AND TRIMETHOPRIM 1 TABLET: 800; 160 TABLET ORAL at 02:12

## 2020-12-23 RX ADMIN — MUPIROCIN 22 G: 20 OINTMENT TOPICAL at 03:12

## 2020-12-23 NOTE — ED TRIAGE NOTES
"+left pointer finger pain/swelling x1 week. Pt denies injury, reports " I noticed it when the weather changed". Mild swelling noted. No limited movement noted  +abscess to right buttocks. Pt denies fever  "

## 2020-12-23 NOTE — ED PROVIDER NOTES
Encounter Date: 12/23/2020       History     Chief Complaint   Patient presents with    Abscess     pt reports worsening abscess to right buttocks x 3 days. denies drainage. Increasing in size everyday. denies fever. Also concerned aboout swelling to left 2nd finger, denies injury, no s/s infection     Patient is a 25-year-old male with recurrent abscesses, no known history of MRSA who presents to the emergency department with a boil to his buttocks.  Patient reports boil is increasing in size and pain over the past 3 days.  He denies drainage.  He denies fever, chills, nausea or vomiting.  He is also complaining of left index finger pain.  He denies injury.  He states middle of his finger appear swollen.  This is the extent of patient's complaints today    The history is provided by the patient.     Review of patient's allergies indicates:  No Known Allergies  No past medical history on file.  Past Surgical History:   Procedure Laterality Date    CYST REMOVAL      cyst removed from under both arms      Family History   Problem Relation Age of Onset    Asthma Mother     Clotting disorder Mother     Lupus Mother     Seizures Mother     Depression Mother     Seizures Father      Social History     Tobacco Use    Smoking status: Former Smoker     Packs/day: 0.50     Years: 4.00     Pack years: 2.00     Types: Cigarettes    Smokeless tobacco: Never Used   Substance Use Topics    Alcohol use: No     Alcohol/week: 0.0 standard drinks    Drug use: Yes     Types: Marijuana     Review of Systems   Constitutional: Negative for chills and fever.   Respiratory: Negative for shortness of breath.    Cardiovascular: Negative for chest pain.   Gastrointestinal: Negative for abdominal pain, nausea and vomiting.   Musculoskeletal: Positive for arthralgias and joint swelling.   Skin: Positive for wound. Negative for rash.   Allergic/Immunologic: Negative for immunocompromised state.   Neurological: Negative for weakness  and numbness.       Physical Exam     Initial Vitals [12/23/20 1426]   BP Pulse Resp Temp SpO2   121/72 100 16 98.5 °F (36.9 °C) 98 %      MAP       --         Physical Exam    Constitutional: Vital signs are normal. He is cooperative. No distress.   HENT:   Head: Normocephalic and atraumatic.   Eyes: Conjunctivae and EOM are normal.   Neck: Normal range of motion. Neck supple.   Cardiovascular: Normal rate, regular rhythm and intact distal pulses.   Pulmonary/Chest: Breath sounds normal. No respiratory distress.   Musculoskeletal:      Comments: Left 1st digit has mild edema and pain to the PIP joint.  No signs of flexor tenosynovitis.   Neurological: He is alert and oriented to person, place, and time. GCS eye subscore is 4. GCS verbal subscore is 5. GCS motor subscore is 6.   Skin: Skin is warm and dry. No rash noted.   4 cm, circumferential area of induration and fluctuance to the right gluteus.  No overlying erythema or warmth.  No involvement of anal region.         ED Course   I & D - Incision and Drainage    Date/Time: 12/23/2020 3:59 PM  Location procedure was performed: [facility] EMERGENCY DEPARTMENT  Performed by: Elvis Bee PA-C  Authorized by: Haris Ulloa MD   Type: abscess  Body area: anogenital (gluteus )  Anesthesia: local infiltration    Anesthesia:  Local Anesthetic: lidocaine 1% without epinephrine  Scalpel size: 11  Incision type: single straight  Complexity: simple  Drainage: pus and  bloody  Drainage amount: copious  Wound treatment: incision,  wound left open,  expression of material and  deloculation  Patient tolerance: Patient tolerated the procedure well with no immediate complications        Labs Reviewed - No data to display       Imaging Results          X-Ray Finger 2 or More Views Left (Final result)  Result time 12/23/20 15:53:27    Final result by Ese Gomes MD (12/23/20 15:53:27)                 Impression:      As above      Electronically signed by: Ese Gomes,  MD  Date:    12/23/2020  Time:    15:53             Narrative:    EXAMINATION:  XR FINGER 2 OR MORE VIEWS LEFT    CLINICAL HISTORY:  arthralgia;    TECHNIQUE:  Three views left 2nd finger    COMPARISON:  None    FINDINGS:  There is no evidence of fracture, osseous destruction or significant degenerative change.                                 Medical Decision Making:   Initial Assessment:   Urgent evaluation of a 25 y.o. male presenting to the emergency department with a gluteal abscess and finger pain. Patient is afebrile, nontoxic appearing and hemodynamically stable.  1) abscess to right gluteus.  Under symptoms of deeper infection or anal genital involvement.  No signs of overlying cellulitis.  Abscess was incised and drained as described in procedure note.  Patient sent home with Bactrim and Bactroban applied.  2) left finger pain-unclear etiology.  No signs of septic joint.  X-ray was obtained which revealed no signs of arthritis.  Patient will be sent home with Naprosyn for pain and swelling.                             Clinical Impression:      1. Abscess, gluteal, right    2. Pain in finger of left hand                               Elvis Bee PA-C  12/23/20 [id number]

## 2021-11-02 ENCOUNTER — HOSPITAL ENCOUNTER (EMERGENCY)
Facility: OTHER | Age: 26
Discharge: HOME | End: 2021-11-02
Attending: EMERGENCY MEDICINE
Payer: MEDICAID

## 2021-11-02 VITALS
WEIGHT: 148 LBS | HEART RATE: 95 BPM | BODY MASS INDEX: 23.23 KG/M2 | OXYGEN SATURATION: 100 % | RESPIRATION RATE: 16 BRPM | SYSTOLIC BLOOD PRESSURE: 126 MMHG | DIASTOLIC BLOOD PRESSURE: 75 MMHG | TEMPERATURE: 98 F | HEIGHT: 67 IN

## 2021-11-02 DIAGNOSIS — Z71.1 CONCERN ABOUT STD IN MALE WITHOUT DIAGNOSIS: ICD-10-CM

## 2021-11-02 DIAGNOSIS — N34.2 URETHRITIS: Primary | ICD-10-CM

## 2021-11-02 LAB
BILIRUB UR QL STRIP: NEGATIVE
CLARITY UR: CLEAR
COLOR UR: YELLOW
GLUCOSE UR QL STRIP: NEGATIVE
HCV AB SERPL QL IA: NEGATIVE
HGB UR QL STRIP: NEGATIVE
HIV 1+2 AB+HIV1 P24 AG SERPL QL IA: NEGATIVE
KETONES UR QL STRIP: NEGATIVE
LEUKOCYTE ESTERASE UR QL STRIP: NEGATIVE
NITRITE UR QL STRIP: NEGATIVE
PH UR STRIP: 7 [PH] (ref 5–8)
PROT UR QL STRIP: NEGATIVE
SP GR UR STRIP: 1.02 (ref 1–1.03)
URN SPEC COLLECT METH UR: NORMAL
UROBILINOGEN UR STRIP-ACNC: NEGATIVE EU/DL

## 2021-11-02 PROCEDURE — 87389 HIV-1 AG W/HIV-1&-2 AB AG IA: CPT | Performed by: EMERGENCY MEDICINE

## 2021-11-02 PROCEDURE — 99284 EMERGENCY DEPT VISIT MOD MDM: CPT | Mod: 25

## 2021-11-02 PROCEDURE — 63600175 PHARM REV CODE 636 W HCPCS: Performed by: PHYSICIAN ASSISTANT

## 2021-11-02 PROCEDURE — 86803 HEPATITIS C AB TEST: CPT | Performed by: EMERGENCY MEDICINE

## 2021-11-02 PROCEDURE — 81003 URINALYSIS AUTO W/O SCOPE: CPT | Performed by: EMERGENCY MEDICINE

## 2021-11-02 PROCEDURE — 96372 THER/PROPH/DIAG INJ SC/IM: CPT

## 2021-11-02 PROCEDURE — 87491 CHLMYD TRACH DNA AMP PROBE: CPT | Performed by: PHYSICIAN ASSISTANT

## 2021-11-02 PROCEDURE — 87591 N.GONORRHOEAE DNA AMP PROB: CPT | Performed by: PHYSICIAN ASSISTANT

## 2021-11-02 PROCEDURE — 25000003 PHARM REV CODE 250: Performed by: PHYSICIAN ASSISTANT

## 2021-11-02 RX ORDER — DOXYCYCLINE 100 MG/1
100 CAPSULE ORAL 2 TIMES DAILY
Qty: 19 CAPSULE | Refills: 0 | Status: SHIPPED | OUTPATIENT
Start: 2021-11-02 | End: 2021-11-12

## 2021-11-02 RX ORDER — DOXYCYCLINE HYCLATE 100 MG
100 TABLET ORAL
Status: COMPLETED | OUTPATIENT
Start: 2021-11-02 | End: 2021-11-02

## 2021-11-02 RX ORDER — CEFTRIAXONE 500 MG/1
500 INJECTION, POWDER, FOR SOLUTION INTRAMUSCULAR; INTRAVENOUS
Status: COMPLETED | OUTPATIENT
Start: 2021-11-02 | End: 2021-11-02

## 2021-11-02 RX ADMIN — DOXYCYCLINE HYCLATE 100 MG: 100 TABLET, COATED ORAL at 11:11

## 2021-11-02 RX ADMIN — CEFTRIAXONE SODIUM 500 MG: 500 INJECTION, POWDER, FOR SOLUTION INTRAMUSCULAR; INTRAVENOUS at 11:11

## 2021-11-04 LAB
C TRACH DNA SPEC QL NAA+PROBE: NOT DETECTED
N GONORRHOEA DNA SPEC QL NAA+PROBE: NOT DETECTED

## 2022-05-17 ENCOUNTER — HOSPITAL ENCOUNTER (EMERGENCY)
Facility: OTHER | Age: 27
Discharge: LEFT BEFORE DISCHARGE | End: 2022-05-17
Payer: MEDICAID

## 2022-05-17 VITALS
RESPIRATION RATE: 18 BRPM | SYSTOLIC BLOOD PRESSURE: 113 MMHG | DIASTOLIC BLOOD PRESSURE: 65 MMHG | HEART RATE: 63 BPM | OXYGEN SATURATION: 98 % | TEMPERATURE: 98 F

## 2022-05-17 DIAGNOSIS — Z53.21 ELOPED FROM EMERGENCY DEPARTMENT: Primary | ICD-10-CM

## 2022-05-17 LAB
BACTERIA #/AREA URNS HPF: ABNORMAL /HPF
BILIRUB UR QL STRIP: NEGATIVE
CLARITY UR: CLEAR
COLOR UR: YELLOW
GLUCOSE UR QL STRIP: NEGATIVE
HGB UR QL STRIP: ABNORMAL
KETONES UR QL STRIP: NEGATIVE
LEUKOCYTE ESTERASE UR QL STRIP: ABNORMAL
MICROSCOPIC COMMENT: ABNORMAL
NITRITE UR QL STRIP: NEGATIVE
PH UR STRIP: 6 [PH] (ref 5–8)
PROT UR QL STRIP: NEGATIVE
RBC #/AREA URNS HPF: 4 /HPF (ref 0–4)
SP GR UR STRIP: >=1.03 (ref 1–1.03)
SQUAMOUS #/AREA URNS HPF: 0 /HPF
TRICHOMONAS UR QL MICRO: ABNORMAL
URN SPEC COLLECT METH UR: ABNORMAL
UROBILINOGEN UR STRIP-ACNC: NEGATIVE EU/DL
WBC #/AREA URNS HPF: 7 /HPF (ref 0–5)

## 2022-05-17 PROCEDURE — 87591 N.GONORRHOEAE DNA AMP PROB: CPT | Performed by: EMERGENCY MEDICINE

## 2022-05-17 PROCEDURE — 99900041 HC LEFT WITHOUT BEING SEEN- EMERGENCY

## 2022-05-17 PROCEDURE — 87491 CHLMYD TRACH DNA AMP PROBE: CPT | Performed by: EMERGENCY MEDICINE

## 2022-05-17 PROCEDURE — 81000 URINALYSIS NONAUTO W/SCOPE: CPT | Performed by: EMERGENCY MEDICINE

## 2022-05-17 NOTE — FIRST PROVIDER EVALUATION
" Emergency Department TeleTriage Encounter Note      CHIEF COMPLAINT    Chief Complaint   Patient presents with    Abscess     Pt reports 2 abscess to abdomen. Pt reports he had it cut and drained last week. Pt denies fever     Penile Discharge     White discharge since Saturday, denies fever/dysuria        VITAL SIGNS   Initial Vitals [05/17/22 1136]   BP Pulse Resp Temp SpO2   135/80 94 20 98.4 °F (36.9 °C) 98 %      MAP       --            ALLERGIES    Review of patient's allergies indicates:  No Known Allergies    PROVIDER TRIAGE NOTE  This is a teletriage evaluation of a 26 y.o. male presenting to the ED complaining of multiple complaints. Patient reports 2 abscesses to abdomen. I&D done "about a month ago" but still having drainage. Not currently taking any antibiotics. Also reports white penile discharge.     Initial orders will be placed and care will be transferred to an alternate provider when patient is roomed for a full evaluation. Any additional orders and the final disposition will be determined by that provider.           ORDERS  Labs Reviewed   C. TRACHOMATIS/N. GONORRHOEAE BY AMP DNA   HIV 1 / 2 ANTIBODY   HEPATITIS C ANTIBODY   URINALYSIS, REFLEX TO URINE CULTURE   URINALYSIS MICROSCOPIC       ED Orders (720h ago, onward)    Start Ordered     Status Ordering Provider    05/17/22 1138 05/17/22 1138  HIV 1/2 Ag/Ab (4th Gen)  STAT         Ordered JAQUELINE SIFUENTES    05/17/22 1138 05/17/22 1138  Hepatitis C Antibody  STAT         Ordered JAQUELINE SIFUENTES    05/17/22 1138 05/17/22 1138  Urinalysis, Reflex to Urine Culture Urine, Clean Catch  STAT         In process JAQUELINE SIFUENTES    05/17/22 1138 05/17/22 1138  C. trachomatis/N. gonorrhoeae by AMP DNA [facility]; Urine  STAT         In process JAQUELINE SIFUENTES    05/17/22 1138 05/17/22 1138  Urinalysis Microscopic  Once         In process JAQUELINE SIFUENTES            Virtual Visit Note: The provider triage portion of this emergency department evaluation and " documentation was performed via [email or web address], a HIPAA-compliant telemedicine application, in concert with a tele-presenter in the room. A face to face patient evaluation with one of my colleagues will occur once the patient is placed in an emergency department room.      DISCLAIMER: This note was prepared with [employer] voice recognition transcription software. Garbled syntax, mangled pronouns, and other bizarre constructions may be attributed to that software system.

## 2022-05-18 LAB
C TRACH DNA SPEC QL NAA+PROBE: NOT DETECTED
N GONORRHOEA DNA SPEC QL NAA+PROBE: NOT DETECTED

## 2022-05-18 NOTE — ED PROVIDER NOTES
Patient eloped from facility prior to being seen by myself.     Nafisa Norwood, SUSANA  05/17/22 9628

## 2023-05-02 ENCOUNTER — PATIENT OUTREACH (OUTPATIENT)
Dept: ADMINISTRATIVE | Facility: HOSPITAL | Age: 28
End: 2023-05-02
Payer: MEDICAID

## 2023-06-25 ENCOUNTER — HOSPITAL ENCOUNTER (EMERGENCY)
Facility: OTHER | Age: 28
Discharge: HOME | End: 2023-06-25
Attending: EMERGENCY MEDICINE
Payer: MEDICAID

## 2023-06-25 VITALS
OXYGEN SATURATION: 99 % | TEMPERATURE: 98 F | HEIGHT: 67 IN | HEART RATE: 81 BPM | BODY MASS INDEX: 23.88 KG/M2 | DIASTOLIC BLOOD PRESSURE: 62 MMHG | WEIGHT: 152.13 LBS | SYSTOLIC BLOOD PRESSURE: 138 MMHG | RESPIRATION RATE: 17 BRPM

## 2023-06-25 DIAGNOSIS — L72.3 SEBACEOUS CYST: ICD-10-CM

## 2023-06-25 DIAGNOSIS — Z20.2 SEXUALLY TRANSMITTED DISEASE EXPOSURE: Primary | ICD-10-CM

## 2023-06-25 LAB
BACTERIA #/AREA URNS HPF: ABNORMAL /HPF
BILIRUB UR QL STRIP: NEGATIVE
CLARITY UR: CLEAR
COLOR UR: YELLOW
GLUCOSE UR QL STRIP: NEGATIVE
HCV AB SERPL QL IA: NEGATIVE
HGB UR QL STRIP: NEGATIVE
HIV 1+2 AB+HIV1 P24 AG SERPL QL IA: NEGATIVE
HYALINE CASTS #/AREA URNS LPF: 0 /LPF
KETONES UR QL STRIP: ABNORMAL
LEUKOCYTE ESTERASE UR QL STRIP: ABNORMAL
MICROSCOPIC COMMENT: ABNORMAL
NITRITE UR QL STRIP: NEGATIVE
PH UR STRIP: 6 [PH] (ref 5–8)
PROT UR QL STRIP: ABNORMAL
RBC #/AREA URNS HPF: 9 /HPF (ref 0–4)
SP GR UR STRIP: >1.03 (ref 1–1.03)
SQUAMOUS #/AREA URNS HPF: 1 /HPF
URN SPEC COLLECT METH UR: ABNORMAL
UROBILINOGEN UR STRIP-ACNC: ABNORMAL EU/DL
WBC #/AREA URNS HPF: 32 /HPF (ref 0–5)

## 2023-06-25 PROCEDURE — 86803 HEPATITIS C AB TEST: CPT

## 2023-06-25 PROCEDURE — 87591 N.GONORRHOEAE DNA AMP PROB: CPT

## 2023-06-25 PROCEDURE — 87389 HIV-1 AG W/HIV-1&-2 AB AG IA: CPT

## 2023-06-25 PROCEDURE — 81000 URINALYSIS NONAUTO W/SCOPE: CPT

## 2023-06-25 PROCEDURE — 96372 THER/PROPH/DIAG INJ SC/IM: CPT | Mod: 59

## 2023-06-25 PROCEDURE — 87661 TRICHOMONAS VAGINALIS AMPLIF: CPT

## 2023-06-25 PROCEDURE — 86592 SYPHILIS TEST NON-TREP QUAL: CPT

## 2023-06-25 PROCEDURE — 99284 EMERGENCY DEPT VISIT MOD MDM: CPT | Mod: 25

## 2023-06-25 PROCEDURE — 10060 I&D ABSCESS SIMPLE/SINGLE: CPT

## 2023-06-25 PROCEDURE — 87086 URINE CULTURE/COLONY COUNT: CPT

## 2023-06-25 PROCEDURE — 63600175 PHARM REV CODE 636 W HCPCS

## 2023-06-25 PROCEDURE — 25000003 PHARM REV CODE 250

## 2023-06-25 RX ORDER — IBUPROFEN 400 MG/1
800 TABLET ORAL
Status: COMPLETED | OUTPATIENT
Start: 2023-06-25 | End: 2023-06-25

## 2023-06-25 RX ORDER — CEFTRIAXONE 500 MG/1
500 INJECTION, POWDER, FOR SOLUTION INTRAMUSCULAR; INTRAVENOUS ONCE
Status: COMPLETED | OUTPATIENT
Start: 2023-06-25 | End: 2023-06-25

## 2023-06-25 RX ORDER — DOXYCYCLINE HYCLATE 100 MG
100 TABLET ORAL
Status: COMPLETED | OUTPATIENT
Start: 2023-06-25 | End: 2023-06-25

## 2023-06-25 RX ORDER — LIDOCAINE HYDROCHLORIDE 10 MG/ML
5 INJECTION INFILTRATION; PERINEURAL
Status: COMPLETED | OUTPATIENT
Start: 2023-06-25 | End: 2023-06-25

## 2023-06-25 RX ADMIN — CEFTRIAXONE SODIUM 500 MG: 500 INJECTION, POWDER, FOR SOLUTION INTRAMUSCULAR; INTRAVENOUS at 09:06

## 2023-06-25 RX ADMIN — DOXYCYCLINE HYCLATE 100 MG: 100 TABLET, COATED ORAL at 09:06

## 2023-06-25 RX ADMIN — LIDOCAINE HYDROCHLORIDE 5 ML: 10 INJECTION, SOLUTION INFILTRATION; PERINEURAL at 09:06

## 2023-06-25 RX ADMIN — IBUPROFEN 800 MG: 400 TABLET ORAL at 09:06

## 2023-06-26 NOTE — ED PROVIDER NOTES
"Encounter Date: 6/25/2023       History     Chief Complaint   Patient presents with    Urinary Frequency     Patient to ED with c/o urinary frequency and " white discharge " x 2 wks . Patient also reports nodule or abscess to left lower abdomen x 1 wk     This is a 28-year-old male who presents to the ED with complaints of urinary frequency and urethral discharge x1 week.  Patient states that he recently had sexual intercourse with a new partner and did not use protection and thinks he may have been exposed to an STD.  Additionally, patient reports return of a chronic sebaceous cyst to his left lower abdomen.  Reports that it was last drained approximately 5 months ago.  Denies fever, chills, dysuria, flank pain, shortness of breath, chest pain.    The history is provided by the patient.   Review of patient's allergies indicates:  No Known Allergies  No past medical history on file.  Past Surgical History:   Procedure Laterality Date    CYST REMOVAL      cyst removed from under both arms      Family History   Problem Relation Age of Onset    Asthma Mother     Clotting disorder Mother     Lupus Mother     Seizures Mother     Depression Mother     Seizures Father      Social History     Tobacco Use    Smoking status: Former     Packs/day: 0.50     Years: 4.00     Pack years: 2.00     Types: Cigarettes    Smokeless tobacco: Never   Substance Use Topics    Alcohol use: No     Alcohol/week: 0.0 standard drinks    Drug use: Yes     Types: Marijuana     Review of Systems   Constitutional:  Negative for chills and fever.   HENT:  Negative for congestion, rhinorrhea and sore throat.    Eyes:  Negative for pain.   Respiratory:  Negative for cough and shortness of breath.    Cardiovascular:  Negative for chest pain.   Gastrointestinal:  Negative for abdominal pain, diarrhea, nausea and vomiting.   Genitourinary:  Positive for frequency and penile discharge. Negative for dysuria and flank pain.   Musculoskeletal:  Negative for " arthralgias and myalgias.   Skin: Negative.         + cyst   Neurological:  Negative for weakness, numbness and headaches.   Hematological: Negative.    Psychiatric/Behavioral:  Negative for agitation and confusion.      Physical Exam     Initial Vitals [06/25/23 2029]   BP Pulse Resp Temp SpO2   125/72 97 16 98.4 °F (36.9 °C) 99 %      MAP       --         Physical Exam    Constitutional: Vital signs are normal. He appears well-developed and well-nourished. He is cooperative.  Non-toxic appearance. He does not appear ill. No distress.   HENT:   Head: Normocephalic and atraumatic.   Eyes: Conjunctivae and lids are normal.   Neck: Trachea normal. Neck supple. No stridor present.   Cardiovascular:  Normal rate and regular rhythm.           Pulmonary/Chest: Effort normal. No tachypnea. No respiratory distress.   Abdominal: Abdomen is soft.   Genitourinary:    Genitourinary Comments: Deferred  exam     Musculoskeletal:      Cervical back: Neck supple.     Neurological: He is alert and oriented to person, place, and time. GCS eye subscore is 4. GCS verbal subscore is 5. GCS motor subscore is 6.   Skin: Skin is warm, dry and intact. No rash noted.   Approximately 4 cm in diameter superficial sebaceous cyst noted to the left lower abdomen.  No overlying erythema or ecchymosis.  No drainage or induration.   Psychiatric: He has a normal mood and affect. His speech is normal and behavior is normal. Thought content normal.       ED Course   I & D - Incision and Drainage    Date/Time: 6/25/2023 10:44 PM  Location procedure was performed: [facility] EMERGENCY DEPARTMENT  Performed by: Nadine Lopez PA-C  Authorized by: Stuart Tran DO   Type: cyst  Body area: trunk  Location details: abdomen  Anesthesia: local infiltration    Anesthesia:  Local Anesthetic: lidocaine 1% without epinephrine  Scalpel size: 11  Incision type: single straight  Incision depth: dermal  Complexity: simple  Drainage: purulent  Drainage amount:  moderate  Wound treatment: incision, drainage and deloculation    Incision depth: dermal      Labs Reviewed   URINALYSIS, REFLEX TO URINE CULTURE - Abnormal; Notable for the following components:       Result Value    Specific Gravity, UA >1.030 (*)     Protein, UA 1+ (*)     Ketones, UA 1+ (*)     Urobilinogen, UA 4.0-6.0 (*)     Leukocytes, UA 3+ (*)     All other components within normal limits    Narrative:     Specimen Source->Urine   URINALYSIS MICROSCOPIC - Abnormal; Notable for the following components:    RBC, UA 9 (*)     WBC, UA 32 (*)     Bacteria Few (*)     All other components within normal limits    Narrative:     Specimen Source->Urine   C. TRACHOMATIS/N. GONORRHOEAE BY AMP DNA   TRICHOMONAS VAGINALIS, RNA,QUAL, URINE   CULTURE, URINE   RPR   HIV 1 / 2 ANTIBODY   HEPATITIS C ANTIBODY          Imaging Results    None          Medications   LIDOcaine HCL 10 mg/ml (1%) injection 5 mL (5 mLs Infiltration Given by Provider 6/25/23 2112)   cefTRIAXone injection 500 mg (500 mg Intramuscular Given 6/25/23 2127)   doxycycline tablet 100 mg (100 mg Oral Given 6/25/23 2112)   ibuprofen tablet 800 mg (800 mg Oral Given 6/25/23 2112)     Medical Decision Making:   Initial Assessment:   Evaluation of a 28-year-old male with concern for STD exposure and sebaceous cyst on his abdomen.  Will treat empirically for STDs with IM ceftriaxone and doxycycline.  Will drained sebaceous cyst here, but patient will need follow-up with Dermatology or General surgery for definitive removal of cyst capsule.  Differential Diagnosis:   Differential Diagnosis includes, but is not limited to:  STI, urethritis, testicular/appendix torsion, epididymitis, orchitis, UTI, kidney stone, urinary retention, appendicitis, prostatitis, testicular mass/neoplasm, incarcerated/strangulated hernia.             ED Course as of 06/25/23 2244   Sun Jun 25, 2023 2241 I&D performed as per procedure note with improvement of sebaceous cyst.  Patient  received complete empiric treatment of GC/chlamydia. Will discharge with prescription for doxycycline. He was educated on abstaining from sex until he has completed treatment and is without symptoms.  Patient given return precautions and educated on worrisome symptoms for which they should return to the ER, including fever, chills, worsening urinary symptoms or flank pain. They reported understanding and all questions were answered. [MARTI]      ED Course User Index  [MARTI] Nadine Lopez PA-C                 Clinical Impression:   Final diagnoses:  [L72.3] Sebaceous cyst  [Z20.2] Sexually transmitted disease exposure (Primary)        ED Disposition Condition    Discharge Stable          ED Prescriptions    None       Follow-up Information       Follow up With Specialties Details Why Contact Info    [facility] - Emergency Dept Emergency Medicine  If symptoms worsen [address] [facility] [address] [phone]    [facility] DERMATOLOGY Dermatology   [address] [id number] [phone]             Nadine Lopez PA-C  06/25/23 4293

## 2023-06-26 NOTE — ED NOTES
Patient identifiers verified and correct for patient  C/C: abscess to lower abdominal, dsyuria  APPEARANCE: awake and alert in NAD.  SKIN: warm, dry and intact except- raised abscess formation, induration, painful to touch, left lower pelvis. Hx of hydradenitis. No other breakdown or bruising.  MUSCULOSKELETAL: Patient moving all extremities spontaneously, no obvious swelling or deformities noted. Ambulates independently.  RESPIRATORY: Denies shortness of breath.Respirations unlabored.   CARDIAC: Denies CP,  distal pulses; no peripheral edema  ABDOMEN: denies abdominal pain and n/v/d   : dysuria, white penile discharge, sexually active  Neurologic: AAO x 4; follows commands equal strength in all extremities; denies numbness/tingling. Denies dizziness

## 2023-06-27 LAB
BACTERIA UR CULT: NORMAL
BACTERIA UR CULT: NORMAL
C TRACH DNA SPEC QL NAA+PROBE: NOT DETECTED
N GONORRHOEA DNA SPEC QL NAA+PROBE: NOT DETECTED
RPR SER QL: NORMAL

## 2023-06-29 LAB
T VAGINALIS RRNA SPEC QL NAA+PROBE: NOT DETECTED
TRICHOMONAS VAGINALIS RNA, QUAL, SOURCE: NORMAL